# Patient Record
Sex: FEMALE | Race: WHITE | NOT HISPANIC OR LATINO | Employment: FULL TIME | URBAN - METROPOLITAN AREA
[De-identification: names, ages, dates, MRNs, and addresses within clinical notes are randomized per-mention and may not be internally consistent; named-entity substitution may affect disease eponyms.]

---

## 2024-01-19 DIAGNOSIS — H81.02 MENIERE'S DISEASE OF LEFT EAR: ICD-10-CM

## 2024-01-19 RX ORDER — TRIAMTERENE/HYDROCHLOROTHIAZID 37.5-25 MG
0.5 TABLET ORAL DAILY
Qty: 45 TABLET | Refills: 0 | Status: SHIPPED | OUTPATIENT
Start: 2024-01-19 | End: 2024-01-22 | Stop reason: WASHOUT

## 2024-01-19 RX ORDER — TRIAMTERENE/HYDROCHLOROTHIAZID 37.5-25 MG
TABLET ORAL
COMMUNITY
Start: 2023-09-01 | End: 2024-01-19 | Stop reason: SDUPTHER

## 2024-02-12 ENCOUNTER — CLINICAL SUPPORT (OUTPATIENT)
Dept: AUDIOLOGY | Facility: CLINIC | Age: 69
End: 2024-02-12
Payer: MEDICARE

## 2024-02-12 DIAGNOSIS — H90.3 SENSORINEURAL HEARING LOSS (SNHL) OF BOTH EARS: ICD-10-CM

## 2024-02-12 PROCEDURE — 92604 REPROGRAM COCHLEAR IMPLT 7/>: CPT | Performed by: SOCIAL WORKER

## 2024-02-12 NOTE — PROGRESS NOTES
History:  Maggie Falk was seen on 2/12/24 for the program optimization of her Nucleus CI24RE cochlear implant used in conjunction with her  Kanso2 upgraded processor  She presents in Program 1  She denies any recent equipment issues. She received her Kanso2 processor Ready to [employer]. She does not notice a huge difference in the sound quality compared to her previous Kanso. She still struggles in back ground noise  Ms. Falk was previously seen 3/2/23    Left implant surgery date: 12/16/2011    Left activation: 1/23/20212    Start/stop times: 885-925    Programming:  Electrode impedances were evaluated for all intra cochlear electrodes and found to be within normal limits  No short or open electrodes were detected  Electrode compliance levels were assessed and found to be satisfactory  Datalogging reveals an average of 13.1 hours of daily use (quiet 8.1, speech in noise 1.9, noise 1.8)  Headset/Coil magnet strength: 1M    Comfort levels were set to loud but comfortable across the array using a standard loudness ranking technique  Threshold levels were set using a standard approach  Comfort Levels were swept   No facial nerve stimulation was observed or reported    Forward Focus was enabled today. Patient was counseled on use. She does not currently have the Nucleus Smart [place] on her phone due to compatibility. She is in the process of upgrading her phone  Left Map details: Map 45  Program 1 = SCAN, Program 2= HOME, Program 3 = One on One and Program 4 = Music    Summary:  The Kanso2 processor was updated with new psychophysical maps today.    Treatment Plan:  Consistent use of the Nucleus cochlear implant using the most comfortable program  Return in 12 months for program optimization and aided testing  Follow up with otology for medical management of cochlear implant  Pursue/continue speech services for aural rehabilitation/auditory training

## 2024-03-23 RX ORDER — ERGOCALCIFEROL 1.25 MG/1
1 CAPSULE ORAL
Qty: 12 CAPSULE | Refills: 0 | OUTPATIENT
Start: 2024-03-23

## 2025-03-03 ENCOUNTER — CLINICAL SUPPORT (OUTPATIENT)
Dept: AUDIOLOGY | Facility: CLINIC | Age: 70
End: 2025-03-03
Payer: MEDICARE

## 2025-03-03 DIAGNOSIS — H90.3 SENSORINEURAL HEARING LOSS (SNHL) OF BOTH EARS: ICD-10-CM

## 2025-03-03 PROCEDURE — 92604 REPROGRAM COCHLEAR IMPLT 7/>: CPT | Performed by: SOCIAL WORKER

## 2025-03-03 PROCEDURE — 92626 EVAL AUD FUNCJ 1ST HOUR: CPT | Mod: 59 | Performed by: SOCIAL WORKER

## 2025-03-03 NOTE — PROGRESS NOTES
History:  Maggie Falk was seen on 3/3/25 for the program optimization of her  Nucleus CI24RE cochlear implant used in conjunction with her  Kanso2 processor at the left ear.  She continues to utilize her right hearing aid  She presents in Program 1 SCAN with a volume of 6  She denies any recent equipment issues   She was last seen 2/12/24  Her processor is under warranty until 3/16/2026    Left implant surgery date: 12/16/2011     Left activation: 1/23/2012    Start/stop times: 4030-9577      Programming:  Electrode impedances were evaluated for all intra cochlear electrodes and found to be within normal limits  No short or open electrodes were detected  Electrode compliance levels were assessed and found to be satisfactory  Datalogging reveals an average of 14 hours of daily use  Headset/Coil magnet strength: 1 M    Comfort levels were set to loud but comfortable across the array using a standard loudness ranking technique    Comfort Levels were swept   No facial nerve stimulation was observed or reported    Left Map details: 46    Aided testing was performed in the soundfield in the bimodal condition while the patient faced the speaker. Aided responses to warble tones were obtained at 25 to 35 dBHL for 250-6000 Hz.  Aided word recognition testing was performed using recorded materials at 50 dBHL  Aided CNC words were 96 % correct  Aided [employer] sentences with at +10 SNR were 95 % correct    Summary:  The Kanso2 processor was updated with new psychophysical maps today.  Aided testing demonstrates excellent aided benefit  Use of the sensitivity setting to improve communication in noisy environments was demonstrated  Patient inquired about a new hearing aid for her right ear. She states her current device is about 10 years old. She would need an updated audiogram.  She was provided insurance verification worksheet with vcodes, diagnosis code and tax IDs for  providers    Treatment Plan:  Consistent use of the Left  Nucleus cochlear implant using the most comfortable program  Return in 12 months for program optimization  Follow up with otology for medical management of cochlear implant  Pursue/continue speech services for aural rehabilitation/auditory training

## 2025-03-06 ENCOUNTER — APPOINTMENT (OUTPATIENT)
Dept: AUDIOLOGY | Facility: CLINIC | Age: 70
End: 2025-03-06
Payer: MEDICARE

## 2025-06-24 DIAGNOSIS — H81.09 MENIERE'S DISEASE, UNSPECIFIED LATERALITY: ICD-10-CM

## 2025-07-01 RX ORDER — TRIAMTERENE AND HYDROCHLOROTHIAZIDE 37.5; 25 MG/1; MG/1
1 TABLET ORAL DAILY
Qty: 90 TABLET | Refills: 3 | Status: SHIPPED | OUTPATIENT
Start: 2025-07-01